# Patient Record
Sex: MALE | Race: BLACK OR AFRICAN AMERICAN | NOT HISPANIC OR LATINO | URBAN - METROPOLITAN AREA
[De-identification: names, ages, dates, MRNs, and addresses within clinical notes are randomized per-mention and may not be internally consistent; named-entity substitution may affect disease eponyms.]

---

## 2020-12-25 ENCOUNTER — EMERGENCY (EMERGENCY)
Facility: HOSPITAL | Age: 50
LOS: 1 days | Discharge: HOME | End: 2020-12-25
Attending: EMERGENCY MEDICINE | Admitting: EMERGENCY MEDICINE
Payer: MEDICAID

## 2020-12-25 VITALS
SYSTOLIC BLOOD PRESSURE: 163 MMHG | HEART RATE: 76 BPM | RESPIRATION RATE: 16 BRPM | OXYGEN SATURATION: 100 % | DIASTOLIC BLOOD PRESSURE: 99 MMHG | TEMPERATURE: 98 F

## 2020-12-25 PROCEDURE — 99284 EMERGENCY DEPT VISIT MOD MDM: CPT

## 2020-12-25 NOTE — ED PROVIDER NOTE - NSFOLLOWUPINSTRUCTIONS_ED_ALL_ED_FT
Headache    A headache is pain or discomfort felt around the head or neck area. The specific cause of a headache may not be found as there are many types including tension headaches, migraine headaches, and cluster headaches. Watch your condition for any changes. Things you can do to manage your pain include taking over the counter and prescription medications as instructed by your health care provider, lying down in a dark quiet room, limiting stress, getting regular sleep, and refraining from alcohol and tobacco products.    SEEK IMMEDIATE MEDICAL CARE IF YOU HAVE ANY OF THE FOLLOWING SYMPTOMS: fever, vomiting, stiff neck, loss of vision, problems with speech, muscle weakness, loss of balance, trouble walking, passing out, or confusion.    Please continue to take any home medications as prescribed. Take Tylenol 325 mg every 4 hours for pain relief/fever control or ibuprofen 600 mg every 6 hours for pain relief/fever control  Your test results from your ED visit were discussed with you prior to discharge  You were provided with a copy of your test results

## 2020-12-25 NOTE — ED PROVIDER NOTE - PATIENT PORTAL LINK FT
You can access the FollowMyHealth Patient Portal offered by [facility] by registering at the following website: [email or web address]. By joining [facility]’s FollowMyHealth portal, you will also be able to view your health information using other applications (apps) compatible with our system.

## 2020-12-25 NOTE — ED PROVIDER NOTE - OBJECTIVE STATEMENT
49 y/o male with a PMHx of HTN presents to the ED x 1 year s/p head trauma secondary to fall from ladder presenting with pain at site of wound x 1 week. He reports having sharp intense pain to the frontal parietal aspect of head with no associated N/V or visual disturbances.

## 2020-12-25 NOTE — ED PROVIDER NOTE - SKIN WOUND DESCRIPTION
Head healed wound from the right frontal aspect of forehead extending up to the parietal scalp. No signs of infection increase warmth and erythema. Scar is tender to touch.

## 2020-12-25 NOTE — ED ADULT TRIAGE NOTE - CHIEF COMPLAINT QUOTE
Pt reports hx of fall 1 year ago with scar on forehead to top of head, pt c/o pain/tenderness at site of scar. Denies dizziness/lightheadedness, blurry vision.

## 2020-12-26 PROCEDURE — 70450 CT HEAD/BRAIN W/O DYE: CPT | Mod: 26

## 2022-05-21 ENCOUNTER — EMERGENCY (EMERGENCY)
Facility: HOSPITAL | Age: 52
LOS: 1 days | Discharge: HOME | End: 2022-05-21
Attending: EMERGENCY MEDICINE | Admitting: EMERGENCY MEDICINE
Payer: MEDICAID

## 2022-05-21 VITALS
DIASTOLIC BLOOD PRESSURE: 91 MMHG | HEART RATE: 82 BPM | TEMPERATURE: 98 F | OXYGEN SATURATION: 99 % | SYSTOLIC BLOOD PRESSURE: 140 MMHG | RESPIRATION RATE: 16 BRPM

## 2022-05-21 VITALS
DIASTOLIC BLOOD PRESSURE: 80 MMHG | HEART RATE: 77 BPM | RESPIRATION RATE: 16 BRPM | TEMPERATURE: 98 F | SYSTOLIC BLOOD PRESSURE: 124 MMHG | OXYGEN SATURATION: 99 %

## 2022-05-21 PROCEDURE — G1004: CPT

## 2022-05-21 PROCEDURE — 99284 EMERGENCY DEPT VISIT MOD MDM: CPT

## 2022-05-21 PROCEDURE — 70450 CT HEAD/BRAIN W/O DYE: CPT | Mod: 26,ME

## 2022-05-21 RX ORDER — METOCLOPRAMIDE HCL 10 MG
10 TABLET ORAL ONCE
Refills: 0 | Status: COMPLETED | OUTPATIENT
Start: 2022-05-21 | End: 2022-05-21

## 2022-05-21 RX ORDER — KETOROLAC TROMETHAMINE 30 MG/ML
15 SYRINGE (ML) INJECTION ONCE
Refills: 0 | Status: DISCONTINUED | OUTPATIENT
Start: 2022-05-21 | End: 2022-05-21

## 2022-05-21 RX ORDER — ACETAMINOPHEN 500 MG
650 TABLET ORAL ONCE
Refills: 0 | Status: COMPLETED | OUTPATIENT
Start: 2022-05-21 | End: 2022-05-21

## 2022-05-21 RX ADMIN — Medication 10 MILLIGRAM(S): at 18:13

## 2022-05-21 RX ADMIN — Medication 15 MILLIGRAM(S): at 18:13

## 2022-05-21 RX ADMIN — Medication 650 MILLIGRAM(S): at 18:13

## 2022-05-21 NOTE — ED PROVIDER NOTE - PATIENT PORTAL LINK FT
You can access the FollowMyHealth Patient Portal offered by [facility] by registering at the following website: [email or web address]. By joining [facility]’s FollowMyHealth portal, you will also be able to view your health information using other applications (apps) compatible with our system.

## 2022-05-21 NOTE — ED ADULT NURSE NOTE - OBJECTIVE STATEMENT
A&Ox4, PMH of HTN, presents to ED c/o headache x 3 days. Respirations are even and unlabored, sating at 100% on RA, 22 G to R forearm placed, medicated as ordered. Pending CTH.

## 2022-05-21 NOTE — ED PROVIDER NOTE - OBJECTIVE STATEMENT
Attending/Vasu: 50 yo M h/o HTN, TIi in 2019 after falling a ladder p/w ~ four days of frontal headache, non-positional, not associated with visual changes, fever/chills, neck pain. Pt has taken Acetaminophen and NSAIDs with min improvement. Pt was seen and eval at an urgent care center and referred to the ED for a HCT.

## 2022-05-21 NOTE — ED PROVIDER NOTE - PHYSICAL EXAMINATION
Attending/Vasu: Well-appearing, NAD; PERRL/EOMI, no temple PT, no papilledema, no retinal hemorrhages, no TMJ PT, supple, no SEVEN, no JVD, RRR, CTAB; Abd-soft, NT/ND, no HSM; no LE edema, A&Ox3, nonfocal, CN II-XII intact; Skin-warm/dry

## 2022-05-21 NOTE — ED PROVIDER NOTE - NS ED ATTENDING STATEMENT MOD
This was a shared visit with the [facility]. I reviewed and verified the documentation and independently performed the documented:

## 2022-05-21 NOTE — ED ADULT NURSE NOTE - NS ED PATIENT SAFETY CONCERN
From: Rosey Mandel  To: Juani Altamirano, DO  Sent: 3/18/2020 11:13 AM CDT  Subject: Non-Urgent Medical Question    Hello Dr. Altamirano,  Last Saturday, 3/14, I did an E-visit for sinus infection/upper respiratory infection symptoms, I have had for almost two weeks now. I was prescribed a nasal spray and benzonatate for my cough. I still have so much sinus pressure in and around my eyes and front of head, but the pressure/pain in my ears has gotten no better, but worse. I have yellow mucus come up every time I cough and blow my nose. Is there anyway to have an antibiotic prescribed for these symptoms? I have had this in the past and that is the only thing that actually works to clear it up.    Thank you for your time.   Ginny Mandel   No

## 2022-05-22 PROBLEM — I10 ESSENTIAL (PRIMARY) HYPERTENSION: Chronic | Status: ACTIVE | Noted: 2020-12-26
